# Patient Record
Sex: MALE | Race: BLACK OR AFRICAN AMERICAN | NOT HISPANIC OR LATINO | Employment: FULL TIME | URBAN - METROPOLITAN AREA
[De-identification: names, ages, dates, MRNs, and addresses within clinical notes are randomized per-mention and may not be internally consistent; named-entity substitution may affect disease eponyms.]

---

## 2019-06-25 PROBLEM — J34.2 DEVIATED NASAL SEPTUM: Status: ACTIVE | Noted: 2019-06-25

## 2019-06-25 PROBLEM — J32.2 CHRONIC ETHMOIDAL SINUSITIS: Status: ACTIVE | Noted: 2019-06-25

## 2019-06-25 PROBLEM — J34.3 NASAL TURBINATE HYPERTROPHY: Status: ACTIVE | Noted: 2019-06-25

## 2019-06-25 PROBLEM — J32.0 CHRONIC MAXILLARY SINUSITIS: Status: ACTIVE | Noted: 2019-06-25

## 2019-06-25 PROBLEM — J34.89 NASAL OBSTRUCTION: Status: ACTIVE | Noted: 2019-06-25

## 2019-06-25 PROBLEM — J32.3 CHRONIC SPHENOIDAL SINUSITIS: Status: ACTIVE | Noted: 2019-06-25

## 2019-06-25 PROBLEM — J32.1 CHRONIC FRONTAL SINUSITIS: Status: ACTIVE | Noted: 2019-06-25

## 2019-08-02 PROBLEM — J34.89 CONCHA BULLOSA: Status: ACTIVE | Noted: 2019-08-02

## 2024-06-03 ENCOUNTER — TELEPHONE (OUTPATIENT)
Dept: UROLOGY | Facility: CLINIC | Age: 59
End: 2024-06-03
Payer: COMMERCIAL

## 2024-06-03 NOTE — TELEPHONE ENCOUNTER
.Outgoing call placed to patient, patient verified name and date of birth, patient notified of below, he verbalized understanding and appt scheduled, no further assistance needed.      ----- Message from Jus Abbott MD sent at 5/29/2024  5:22 PM CDT -----  Mr Alexis is a long time patient of mine from my previous office.  Mr Alexis missed his appointment today on 5-29-24.  Please call Mr Alexis to see if he would like to reschedule his appointment with me.

## 2024-06-12 ENCOUNTER — LAB VISIT (OUTPATIENT)
Dept: LAB | Facility: HOSPITAL | Age: 59
End: 2024-06-12
Attending: UROLOGY
Payer: COMMERCIAL

## 2024-06-12 ENCOUNTER — OFFICE VISIT (OUTPATIENT)
Dept: UROLOGY | Facility: CLINIC | Age: 59
End: 2024-06-12
Payer: COMMERCIAL

## 2024-06-12 VITALS
WEIGHT: 181.69 LBS | DIASTOLIC BLOOD PRESSURE: 76 MMHG | SYSTOLIC BLOOD PRESSURE: 119 MMHG | HEART RATE: 61 BPM | HEIGHT: 70 IN | BODY MASS INDEX: 26.01 KG/M2 | RESPIRATION RATE: 14 BRPM

## 2024-06-12 DIAGNOSIS — N40.1 BPH WITH OBSTRUCTION/LOWER URINARY TRACT SYMPTOMS: ICD-10-CM

## 2024-06-12 DIAGNOSIS — R97.20 ELEVATED PSA: Primary | ICD-10-CM

## 2024-06-12 DIAGNOSIS — N13.8 BPH WITH OBSTRUCTION/LOWER URINARY TRACT SYMPTOMS: ICD-10-CM

## 2024-06-12 DIAGNOSIS — R97.20 ELEVATED PSA: ICD-10-CM

## 2024-06-12 LAB
BILIRUB UR QL STRIP: NEGATIVE
GLUCOSE UR QL STRIP: NEGATIVE
KETONES UR QL STRIP: NEGATIVE
LEUKOCYTE ESTERASE UR QL STRIP: NEGATIVE
PH, POC UA: 5.5
POC BLOOD, URINE: NEGATIVE
POC NITRATES, URINE: NEGATIVE
PROT UR QL STRIP: POSITIVE
SP GR UR STRIP: 1.03 (ref 1–1.03)
UROBILINOGEN UR STRIP-ACNC: 0.2 (ref 0.3–2.2)

## 2024-06-12 PROCEDURE — 82565 ASSAY OF CREATININE: CPT | Performed by: UROLOGY

## 2024-06-12 PROCEDURE — 3074F SYST BP LT 130 MM HG: CPT | Mod: CPTII,S$GLB,, | Performed by: UROLOGY

## 2024-06-12 PROCEDURE — 1159F MED LIST DOCD IN RCRD: CPT | Mod: CPTII,S$GLB,, | Performed by: UROLOGY

## 2024-06-12 PROCEDURE — 3078F DIAST BP <80 MM HG: CPT | Mod: CPTII,S$GLB,, | Performed by: UROLOGY

## 2024-06-12 PROCEDURE — 81003 URINALYSIS AUTO W/O SCOPE: CPT | Mod: QW,S$GLB,, | Performed by: UROLOGY

## 2024-06-12 PROCEDURE — 99214 OFFICE O/P EST MOD 30 MIN: CPT | Mod: S$GLB,,, | Performed by: UROLOGY

## 2024-06-12 PROCEDURE — 99999 PR PBB SHADOW E&M-EST. PATIENT-LVL III: CPT | Mod: PBBFAC,,, | Performed by: UROLOGY

## 2024-06-12 PROCEDURE — 3008F BODY MASS INDEX DOCD: CPT | Mod: CPTII,S$GLB,, | Performed by: UROLOGY

## 2024-06-12 PROCEDURE — 84153 ASSAY OF PSA TOTAL: CPT | Performed by: UROLOGY

## 2024-06-12 PROCEDURE — 36415 COLL VENOUS BLD VENIPUNCTURE: CPT | Performed by: UROLOGY

## 2024-06-12 NOTE — PROGRESS NOTES
"Subjective:       Patient ID: Jeffrey Alexis is a 58 y.o. male.    Chief Complaint: Annual Exam      History of Present Illness:     Mr Alexis has an elevated PSA.  His most recent PSA was 5.54 on 1-18-24.  He had a prostate MRI about a year ago did not show any evidence of prostate cancer.  Denies ever having a prostate biopsy.  Denies a family history of prostate cancer.  Urinary flow varies.  Occasional feelings of incomplete bladder emptying.  Nocturia X 1.  He is not bothered by his urinary symptoms.  No ED.         History reviewed. No pertinent past medical history.  No family history on file.  Social History     Socioeconomic History    Marital status: Single   Tobacco Use    Smoking status: Never    Smokeless tobacco: Never   Substance and Sexual Activity    Alcohol use: Never     Outpatient Encounter Medications as of 6/12/2024   Medication Sig Dispense Refill    budesonide (PULMICORT) 0.5 mg/2 mL nebulizer solution Add to sinus irrigations twice daily (Patient not taking: Reported on 6/12/2024) 120 mL 11    budesonide (PULMICORT) 0.5 mg/2 mL nebulizer solution Add one ampule to sinus rinses twice daily (Patient not taking: Reported on 6/12/2024) 120 mL 11    sod chlor-bicarb-squeez bottle (NEILMED SINUS RINSE COMPLETE) pkdv by sinus irrigation route. (Patient not taking: Reported on 6/12/2024)       No facility-administered encounter medications on file as of 6/12/2024.        Review of Systems   Constitutional:  Negative for chills and fever.   Respiratory:  Negative for shortness of breath.    Cardiovascular:  Negative for chest pain.   Gastrointestinal:  Negative for nausea and vomiting.   Genitourinary:  Negative for hematuria.   Musculoskeletal:  Negative for back pain.   Skin:  Negative for rash.   Neurological:  Negative for dizziness.   Psychiatric/Behavioral:  Negative for agitation.        Objective:     /76 (BP Location: Left arm, Patient Position: Sitting)   Pulse 61   Resp 14   Ht 5' 10" " (1.778 m)   Wt 82.4 kg (181 lb 10.5 oz)   BMI 26.07 kg/m²     Physical Exam  Constitutional:       Appearance: Normal appearance.   Pulmonary:      Effort: Pulmonary effort is normal.   Abdominal:      Palpations: Abdomen is soft.   Genitourinary:     Comments: Prostate exam deferred  Neurological:      Mental Status: He is alert and oriented to person, place, and time.   Psychiatric:         Mood and Affect: Mood normal.         Office Visit on 06/12/2024   Component Date Value Ref Range Status    POC Blood, Urine 06/12/2024 Negative (A)  Negative Final    POC Bilirubin, Urine 06/12/2024 Negative  Negative Final    POC Urobilinogen, Urine 06/12/2024 0.2 (A)  0.3 - 2.2 Final    POC Ketones, Urine 06/12/2024 Negative  Negative Final    POC Protein, Urine 06/12/2024 Positive (A)  Negative Final    POC Nitrates, Urine 06/12/2024 Negative  Negative Final    POC Glucose, Urine 06/12/2024 Negative  Negative Final    pH, UA 06/12/2024 5.5   Final    POC Specific Gravity, Urine 06/12/2024 1.030 (A)  1.003 - 1.029 Final    POC Leukocytes, Urine 06/12/2024 Negative  Negative Final        No results found for this or any previous visit (from the past 8760 hour(s)).     Assessment:       1. Elevated PSA    2. BPH with obstruction/lower urinary tract symptoms      Plan:     Orders Placed This Encounter    PSA, Total and Free    CREATININE, SERUM    POCT Urinalysis, Dipstick, Automated, W/O Scope          1-18-24  PSA 5.54    1-4-23  PSA 5.26    12-30-21  PSA 4.24    12-19-19  PSA 3.87    1-18-24  Cr 1.03    I reviewed the above lab results.         Assessment:  - Elevated PSA.  Prostate MRI about a year ago did not show any evidence of prostate cancer.  - BPH with mild non bothersome LUTS.    Plan:  - PSA and creatinine today.  If his PSA increases, then will consider a prostate MRI.  - RTC in 6 months.

## 2024-06-13 DIAGNOSIS — R97.20 ELEVATED PSA: Primary | ICD-10-CM

## 2024-06-13 LAB
CREAT SERPL-MCNC: 1.2 MG/DL (ref 0.5–1.4)
EST. GFR  (NO RACE VARIABLE): >60 ML/MIN/1.73 M^2
PROSTATE SPECIFIC ANTIGEN, TOTAL: 5.2 NG/ML (ref 0–4)
PSA FREE MFR SERPL: 18.27 %
PSA FREE SERPL-MCNC: 0.95 NG/ML (ref 0–1.5)

## 2024-06-14 ENCOUNTER — TELEPHONE (OUTPATIENT)
Dept: UROLOGY | Facility: CLINIC | Age: 59
End: 2024-06-14
Payer: COMMERCIAL

## 2024-06-14 NOTE — TELEPHONE ENCOUNTER
Called patient and after verifying name and date of birth rescheduled patient's appointment to October at [facility] and with a lab appointment the week before. Patient voiced understanding.    Milagro Arzaet LPN    ----- Message from Jus Abbott MD sent at 6/13/2024  5:06 PM CDT -----  I called and spoke with Mr Alexis just now and I let him know that I reviewed his PSA result and that his PSA is elevated at 5.2 but that it has mildly decreased from his previous PSA of 5.54 in January 2024.  I discussed options with him and the mutual decision was made to recheck his PSA in 4 months.  Please call the patient to reschedule his appointment to see me in 4 months.  Please schedule him a lab visit to recheck his PSA a few days prior to his 4 month appointment.  Also, I still have not received his [facility] records so please make sure that we get these records scanned into his chart.

## 2024-10-16 ENCOUNTER — LAB VISIT (OUTPATIENT)
Dept: LAB | Facility: HOSPITAL | Age: 59
End: 2024-10-16
Attending: UROLOGY
Payer: COMMERCIAL

## 2024-10-16 DIAGNOSIS — R97.20 ELEVATED PSA: ICD-10-CM

## 2024-10-16 PROCEDURE — 84153 ASSAY OF PSA TOTAL: CPT | Performed by: UROLOGY

## 2024-10-16 PROCEDURE — 84154 ASSAY OF PSA FREE: CPT | Performed by: UROLOGY

## 2024-10-16 PROCEDURE — 36415 COLL VENOUS BLD VENIPUNCTURE: CPT | Performed by: UROLOGY

## 2024-10-17 LAB
PROSTATE SPECIFIC ANTIGEN, TOTAL: 4.4 NG/ML (ref 0–4)
PSA FREE MFR SERPL: 17.95 %
PSA FREE SERPL-MCNC: 0.79 NG/ML (ref 0–1.5)

## 2024-10-23 ENCOUNTER — OFFICE VISIT (OUTPATIENT)
Dept: UROLOGY | Facility: CLINIC | Age: 59
End: 2024-10-23
Payer: COMMERCIAL

## 2024-10-23 VITALS
DIASTOLIC BLOOD PRESSURE: 66 MMHG | SYSTOLIC BLOOD PRESSURE: 113 MMHG | HEART RATE: 73 BPM | WEIGHT: 181.69 LBS | BODY MASS INDEX: 26.07 KG/M2

## 2024-10-23 DIAGNOSIS — R97.20 ELEVATED PSA: Primary | ICD-10-CM

## 2024-10-23 DIAGNOSIS — N40.1 HYPERPLASIA OF PROSTATE WITH LOWER URINARY TRACT SYMPTOMS (LUTS): ICD-10-CM

## 2024-10-23 DIAGNOSIS — R35.1 NOCTURIA: ICD-10-CM

## 2024-10-23 PROCEDURE — 3008F BODY MASS INDEX DOCD: CPT | Mod: CPTII,S$GLB,, | Performed by: UROLOGY

## 2024-10-23 PROCEDURE — 3078F DIAST BP <80 MM HG: CPT | Mod: CPTII,S$GLB,, | Performed by: UROLOGY

## 2024-10-23 PROCEDURE — 1159F MED LIST DOCD IN RCRD: CPT | Mod: CPTII,S$GLB,, | Performed by: UROLOGY

## 2024-10-23 PROCEDURE — 3074F SYST BP LT 130 MM HG: CPT | Mod: CPTII,S$GLB,, | Performed by: UROLOGY

## 2024-10-23 PROCEDURE — 99999 PR PBB SHADOW E&M-EST. PATIENT-LVL III: CPT | Mod: PBBFAC,,, | Performed by: UROLOGY

## 2024-10-23 PROCEDURE — 99214 OFFICE O/P EST MOD 30 MIN: CPT | Mod: S$GLB,,, | Performed by: UROLOGY

## 2024-10-23 NOTE — PROGRESS NOTES
Subjective:       Patient ID: Jeffrey Alexis is a 59 y.o. male.    Chief Complaint: Follow-up      History of Present Illness:     Mr Alexis has an elevated PSA that has decreased with observation.  He underwent a prostate MRI about a year ago did not show any evidence of prostate cancer.  No family history of prostate cancer.  He has BPH with mild non bothersome LUTS.  He says if he holds his urine for too long he sometimes has difficulty emptying his bladder.  Nocturia X 1-2.    Follow-up  Pertinent negatives include no chest pain, chills, fever, nausea, rash or vomiting.        No past medical history on file.  No family history on file.  Social History     Socioeconomic History    Marital status: Single   Tobacco Use    Smoking status: Never    Smokeless tobacco: Never   Substance and Sexual Activity    Alcohol use: Never     Outpatient Encounter Medications as of 10/23/2024   Medication Sig Dispense Refill    budesonide (PULMICORT) 0.5 mg/2 mL nebulizer solution Add to sinus irrigations twice daily 120 mL 11    budesonide (PULMICORT) 0.5 mg/2 mL nebulizer solution Add one ampule to sinus rinses twice daily 120 mL 11    sod chlor-bicarb-squeez bottle (NEILMED SINUS RINSE COMPLETE) pkdv by sinus irrigation route.       No facility-administered encounter medications on file as of 10/23/2024.        Review of Systems   Constitutional:  Negative for chills and fever.   Respiratory:  Negative for shortness of breath.    Cardiovascular:  Negative for chest pain.   Gastrointestinal:  Negative for nausea and vomiting.   Genitourinary:  Negative for dysuria and hematuria.   Musculoskeletal:  Negative for back pain.   Skin:  Negative for rash.   Neurological:  Negative for dizziness.   Psychiatric/Behavioral:  Negative for agitation.        Objective:     /66   Pulse 73   Wt 82.4 kg (181 lb 10.5 oz)   BMI 26.07 kg/m²     Physical Exam  Constitutional:       Appearance: Normal appearance.   Pulmonary:      Effort:  Pulmonary effort is normal.   Abdominal:      Palpations: Abdomen is soft.   Neurological:      Mental Status: He is alert and oriented to person, place, and time.   Psychiatric:         Mood and Affect: Mood normal.         No visits with results within 1 Day(s) from this visit.   Latest known visit with results is:   Lab Visit on 10/16/2024   Component Date Value Ref Range Status    PSA Total 10/16/2024 4.4 (H)  0.00 - 4.00 ng/mL Final    Comment: The testing method is a chemiluminescent microparticle immunoassay   manufactured by [employer] and performed on the [employer]k   or   [employer] system. Values obtained with different assay manufacturers   for   methods may be different and cannot be used interchangeably.  PSA Expected levels:  Hormonal Therapy: <0.05 ng/ml  Prostatectomy: <0.01 ng/ml  Radiation Therapy: <1.00 ng/ml      PSA, Free 10/16/2024 0.79  0.00 - 1.50 ng/mL Final    Comment: The testing method is a chemiluminescent microparticle immunoassay   manufactured by [employer] and performed on the DecisionDesk   or   [employer] system. Values obtained with different assay manufacturers   for   methods may be different and cannot be used interchangeably.      PSA, Free % 10/16/2024 17.95  Not established % Final        No results found for this or any previous visit (from the past 8760 hours).     Assessment:       1. Elevated PSA    2. Nocturia    3. Hyperplasia of prostate with lower urinary tract symptoms (LUTS)      Plan:     Orders Placed This Encounter    PSA, Total and Free        10-16-24  PSA 4.4.  Free % PSA 17.95.    6-12-24  PSA 5.2.  Free % PSA 18.27.     1-18-24  PSA 5.54     1-4-23  PSA 5.26     12-30-21  PSA 4.24     12-19-19  PSA 3.87    6-12-24  Cr 1.2.  GFR >60.     1-18-24  Cr 1.03     I reviewed the above lab results.            Assessment:  - Elevated PSA that has decreased with observation.  Prostate MRI about a year ago did not show any evidence of prostate  cancer.  - BPH with mild non bothersome LUTS.     Plan:  - The mutual decision was made to continue with observation of his elevated PSA with a repeat PSA in 6 months.  - I discussed dietary modifications with him today and I recommended he drink mostly water during the day.  - I recommended he limit his fluid intake at night to small amounts of water and to void just prior to bedtime.   - PSA in 6 months a few days prior to a 6 month office visit.

## 2025-04-16 ENCOUNTER — LAB VISIT (OUTPATIENT)
Dept: LAB | Facility: HOSPITAL | Age: 60
End: 2025-04-16
Attending: UROLOGY
Payer: COMMERCIAL

## 2025-04-16 DIAGNOSIS — R97.20 ELEVATED PSA: ICD-10-CM

## 2025-04-16 LAB
PSA FREE MFR SERPL: 14.78 %
PSA FREE SERPL-MCNC: 0.94 NG/ML
PSA SERPL-MCNC: 6.36 NG/ML

## 2025-04-16 PROCEDURE — 84154 ASSAY OF PSA FREE: CPT

## 2025-04-16 PROCEDURE — 36415 COLL VENOUS BLD VENIPUNCTURE: CPT

## 2025-04-24 ENCOUNTER — TELEPHONE (OUTPATIENT)
Dept: UROLOGY | Facility: CLINIC | Age: 60
End: 2025-04-24
Payer: COMMERCIAL

## 2025-04-24 ENCOUNTER — RESULTS FOLLOW-UP (OUTPATIENT)
Dept: UROLOGY | Facility: CLINIC | Age: 60
End: 2025-04-24

## 2025-04-24 NOTE — TELEPHONE ENCOUNTER
4/24/2025 0852 Spoke with the patient in regards to his elevated PSA level of 6.36. I confirmed his appt for 4/30/2025 at 1600. He states he rescheduled last week because he was out of town. He will be here for his upcoming appt. Pt verbalized understanding and had no further questions.   ARMENKarl Hendrickson LPN    ----- Message from Jus Abbott MD sent at 4/24/2025  7:38 AM CDT -----  Please call Mr Alexis and let him know that I reviewed his PSA and his PSA is elevated at 6.36 and it has increased from his previous PSA of 4.4 in October 2024.  He was scheduled to see me yesterday   but it looks like he rescheduled to see me next week.  Make sure he knows the details of his upcoming appointment with me.  Ask him if he has any questions.  ----- Message -----  From: Lab, Background User  Sent: 4/16/2025   3:41 PM CDT  To: Jus Abbott MD

## 2025-04-30 ENCOUNTER — OFFICE VISIT (OUTPATIENT)
Dept: UROLOGY | Facility: CLINIC | Age: 60
End: 2025-04-30
Payer: COMMERCIAL

## 2025-04-30 VITALS
BODY MASS INDEX: 26.01 KG/M2 | HEIGHT: 70 IN | SYSTOLIC BLOOD PRESSURE: 125 MMHG | WEIGHT: 181.69 LBS | HEART RATE: 72 BPM | DIASTOLIC BLOOD PRESSURE: 70 MMHG

## 2025-04-30 DIAGNOSIS — N40.0 BPH WITHOUT URINARY OBSTRUCTION: ICD-10-CM

## 2025-04-30 DIAGNOSIS — R97.20 ELEVATED PSA: Primary | ICD-10-CM

## 2025-04-30 PROCEDURE — 99214 OFFICE O/P EST MOD 30 MIN: CPT | Mod: S$GLB,,, | Performed by: UROLOGY

## 2025-04-30 PROCEDURE — 1159F MED LIST DOCD IN RCRD: CPT | Mod: CPTII,S$GLB,, | Performed by: UROLOGY

## 2025-04-30 PROCEDURE — 3078F DIAST BP <80 MM HG: CPT | Mod: CPTII,S$GLB,, | Performed by: UROLOGY

## 2025-04-30 PROCEDURE — 3074F SYST BP LT 130 MM HG: CPT | Mod: CPTII,S$GLB,, | Performed by: UROLOGY

## 2025-04-30 PROCEDURE — 3008F BODY MASS INDEX DOCD: CPT | Mod: CPTII,S$GLB,, | Performed by: UROLOGY

## 2025-04-30 PROCEDURE — 99999 PR PBB SHADOW E&M-EST. PATIENT-LVL III: CPT | Mod: PBBFAC,,, | Performed by: UROLOGY

## 2025-04-30 NOTE — PROGRESS NOTES
"Subjective:       Patient ID: Jeffrey Alexis is a 59 y.o. male.    Chief Complaint: Other Misc (6 mo f/u)      History of Present Illness:     Mr Alexis has an elevated PSA that has increased with observation.  His PSA increased from 4.4 on 10-16-24 to 6.36 on 4-16-25.  He has not had a prostate biopsy.  He underwent a prostate MRI on 11-22-22 that showed no suspicious prostate lesions identified by MRI.  BPH.  PIRADS 2.  Prostate size is 55.9 cc.  There is no lymphadenopathy. HE has BPH without significant LUTS.  Good urinary flow.  Nocturia X 1 on average.         No past medical history on file.  No family history on file.  Social History[1]  Encounter Medications[2]     Review of Systems   Constitutional:  Negative for chills and fever.   Respiratory:  Negative for shortness of breath.    Cardiovascular:  Negative for chest pain.   Gastrointestinal:  Negative for nausea and vomiting.   Genitourinary:  Negative for difficulty urinating, dysuria and hematuria.   Musculoskeletal:  Negative for back pain.   Skin:  Negative for rash.   Neurological:  Negative for dizziness.   Psychiatric/Behavioral:  Negative for agitation.        Objective:     /70 (BP Location: Right arm, Patient Position: Sitting)   Pulse 72   Ht 5' 10" (1.778 m)   Wt 82.4 kg (181 lb 10.5 oz)   BMI 26.07 kg/m²     Physical Exam  Constitutional:       Appearance: Normal appearance.   Pulmonary:      Effort: Pulmonary effort is normal.   Abdominal:      Palpations: Abdomen is soft.   Genitourinary:     Comments: Prostate exam deferred  Neurological:      Mental Status: He is alert and oriented to person, place, and time.   Psychiatric:         Mood and Affect: Mood normal.         No visits with results within 1 Day(s) from this visit.   Latest known visit with results is:   Lab Visit on 04/16/2025   Component Date Value Ref Range Status    Prostate Specific Antigen 04/16/2025 6.36 (H)  <=4.00 ng/mL Final    PSA Expected levels:  Hormonal " therapy: < 0.05 ng/mL   Prostatectomy: < 0.01 ng/mL   Radiation therapy: < 1.00 ng/mL      Prostate Specific Antigen Free 04/16/2025 0.94  <=1.50 ng/mL Final    PSA % Free 04/16/2025 14.78  Not established % Final        No results found for this or any previous visit (from the past 8760 hours).     Assessment:       1. Elevated PSA    2. BPH without urinary obstruction        Plan:     Orders Placed This Encounter    PSA, Total and Free    Creatinine, Serum          11-22-22  Prostate MRI.  BRG.  See report.  No suspicious prostate lesions identified by MRI.  BPH.  PIRADS 2.  Prostate size is 55.9 cc.  There is no lymphadenopathy.    I reviewed the above imaging result.         4-16-25  PSA 6.36.  Free % PSA 14.78.    10-16-24  PSA 4.4.  Free % PSA 17.95.     6-12-24  PSA 5.2.  Free % PSA 18.27.     1-18-24  PSA 5.54    7-25-23  PSA 4.64.  Free % PSA 15.9.     1-4-23  PSA 5.26     12-30-21  PSA 4.24     12-19-19  PSA 3.87     6-12-24  Cr 1.2.  GFR >60.     1-18-24  Cr 1.03     I reviewed the above lab results.            Assessment:  - Elevated PSA that has increased with observation.    - Prostate MRI on 11-22-22 showed no suspicious prostate lesions identified by MRI.  BPH.  PIRADS 2.  Prostate size is 55.9 cc.  There is no lymphadenopathy.  - BPH without significant LUTS.     Plan:  - I discussed his elevated and rising PSA with the patient today and I discussed doing a prostate MRI.  The patient says he would like to hold off on the prostate MRI at this time and recheck his PSA in 1 month.  - I discussed dietary modifications with him today and I recommended he drink mostly water during the day.  - PSA and creatinine both 1 month.  - RTC in 6 months or sooner depending on hs PSA result.                       [1]   Social History  Socioeconomic History    Marital status: Single   Tobacco Use    Smoking status: Never    Smokeless tobacco: Never   Substance and Sexual Activity    Alcohol use: Never   [2]    Outpatient Encounter Medications as of 4/30/2025   Medication Sig Dispense Refill    budesonide (PULMICORT) 0.5 mg/2 mL nebulizer solution Add to sinus irrigations twice daily 120 mL 11    budesonide (PULMICORT) 0.5 mg/2 mL nebulizer solution Add one ampule to sinus rinses twice daily 120 mL 11    sod chlor-bicarb-squeez bottle (NEILMED SINUS RINSE COMPLETE) pkdv by sinus irrigation route.       No facility-administered encounter medications on file as of 4/30/2025.

## 2025-05-30 ENCOUNTER — LAB VISIT (OUTPATIENT)
Dept: LAB | Facility: HOSPITAL | Age: 60
End: 2025-05-30
Attending: UROLOGY
Payer: COMMERCIAL

## 2025-05-30 DIAGNOSIS — R97.20 ELEVATED PSA: ICD-10-CM

## 2025-05-30 LAB
CREAT SERPL-MCNC: 0.9 MG/DL (ref 0.5–1.4)
GFR SERPLBLD CREATININE-BSD FMLA CKD-EPI: >60 ML/MIN/1.73/M2
PSA FREE MFR SERPL: 16.6 %
PSA FREE SERPL-MCNC: 1.24 NG/ML
PSA SERPL-MCNC: 7.47 NG/ML

## 2025-05-30 PROCEDURE — 36415 COLL VENOUS BLD VENIPUNCTURE: CPT

## 2025-05-30 PROCEDURE — 84154 ASSAY OF PSA FREE: CPT

## 2025-05-30 PROCEDURE — 82565 ASSAY OF CREATININE: CPT

## 2025-06-03 ENCOUNTER — RESULTS FOLLOW-UP (OUTPATIENT)
Dept: UROLOGY | Facility: HOSPITAL | Age: 60
End: 2025-06-03

## 2025-06-03 ENCOUNTER — TELEPHONE (OUTPATIENT)
Facility: CLINIC | Age: 60
End: 2025-06-03
Payer: COMMERCIAL

## 2025-06-03 DIAGNOSIS — R97.20 ELEVATED PSA: Primary | ICD-10-CM

## 2025-06-20 ENCOUNTER — TELEPHONE (OUTPATIENT)
Dept: UROLOGY | Facility: CLINIC | Age: 60
End: 2025-06-20
Payer: COMMERCIAL

## 2025-06-20 NOTE — TELEPHONE ENCOUNTER
Outgoing call placed to patient in regards to rescheduling 07/02/25 appointment due to provider being out of the office. No answer, left vm instructing patient to return call for assistance on rescheduling.

## 2025-06-25 ENCOUNTER — HOSPITAL ENCOUNTER (OUTPATIENT)
Dept: RADIOLOGY | Facility: HOSPITAL | Age: 60
Discharge: HOME | End: 2025-06-25
Attending: UROLOGY
Payer: COMMERCIAL

## 2025-06-25 DIAGNOSIS — R97.20 ELEVATED PSA: ICD-10-CM

## 2025-06-25 PROCEDURE — 25500020 PHARM REV CODE 255: Performed by: UROLOGY

## 2025-06-25 PROCEDURE — A9585 GADOBUTROL INJECTION: HCPCS | Performed by: UROLOGY

## 2025-06-25 PROCEDURE — 72197 MRI PELVIS W/O & W/DYE: CPT | Mod: 26,,, | Performed by: RADIOLOGY

## 2025-06-25 PROCEDURE — 72197 MRI PELVIS W/O & W/DYE: CPT | Mod: TC

## 2025-06-25 RX ORDER — GADOBUTROL 604.72 MG/ML
8.5 INJECTION INTRAVENOUS
Status: COMPLETED | OUTPATIENT
Start: 2025-06-25 | End: 2025-06-25

## 2025-06-25 RX ADMIN — GADOBUTROL 8.5 ML: 604.72 INJECTION INTRAVENOUS at 03:06

## 2025-06-29 ENCOUNTER — RESULTS FOLLOW-UP (OUTPATIENT)
Facility: CLINIC | Age: 60
End: 2025-06-29

## 2025-06-30 ENCOUNTER — TELEPHONE (OUTPATIENT)
Dept: UROLOGY | Facility: CLINIC | Age: 60
End: 2025-06-30
Payer: COMMERCIAL

## 2025-06-30 NOTE — TELEPHONE ENCOUNTER
Called and spoke to pt; informed him of his test result; pt verbalized understanding.  Aixa Douglas LPN  ----- Message from Jus Abbott MD sent at 6/29/2025 12:26 PM CDT -----  Please call Mr Alexis and let him know that I reviewed his prostate MRI result and that it did not show a concerning prostate lesion.  I does show that he has an enlarged prostate gland.  Explain that   a prostate MRI can potentially miss prostate cancer.  Make sure he knows the details of his follow up appointment with me in July 2025 for further discussion.  Ask him if he has any questions.  ----- Message -----  From: Interface, Rad Results In  Sent: 6/25/2025   4:32 PM CDT  To: Jus Abbott MD

## 2025-07-23 ENCOUNTER — OFFICE VISIT (OUTPATIENT)
Dept: UROLOGY | Facility: CLINIC | Age: 60
End: 2025-07-23
Payer: COMMERCIAL

## 2025-07-23 VITALS
SYSTOLIC BLOOD PRESSURE: 125 MMHG | HEART RATE: 80 BPM | BODY MASS INDEX: 26.01 KG/M2 | HEIGHT: 70 IN | DIASTOLIC BLOOD PRESSURE: 75 MMHG | WEIGHT: 181.69 LBS

## 2025-07-23 DIAGNOSIS — N40.0 BPH WITHOUT URINARY OBSTRUCTION: ICD-10-CM

## 2025-07-23 DIAGNOSIS — R97.20 ELEVATED PSA: Primary | ICD-10-CM

## 2025-07-23 PROCEDURE — 3074F SYST BP LT 130 MM HG: CPT | Mod: CPTII,S$GLB,, | Performed by: UROLOGY

## 2025-07-23 PROCEDURE — 1159F MED LIST DOCD IN RCRD: CPT | Mod: CPTII,S$GLB,, | Performed by: UROLOGY

## 2025-07-23 PROCEDURE — 99999 PR PBB SHADOW E&M-EST. PATIENT-LVL III: CPT | Mod: PBBFAC,,, | Performed by: UROLOGY

## 2025-07-23 PROCEDURE — 3008F BODY MASS INDEX DOCD: CPT | Mod: CPTII,S$GLB,, | Performed by: UROLOGY

## 2025-07-23 PROCEDURE — 99214 OFFICE O/P EST MOD 30 MIN: CPT | Mod: S$GLB,,, | Performed by: UROLOGY

## 2025-07-23 PROCEDURE — 3078F DIAST BP <80 MM HG: CPT | Mod: CPTII,S$GLB,, | Performed by: UROLOGY

## 2025-07-23 NOTE — PROGRESS NOTES
"Subjective:       Patient ID: Jeffrey Alexis is a 59 y.o. male.    Chief Complaint: Other Misc (MRI F/U )      History of Present Illness:     Mr Alexis has an elevated PSA that has increased with observation.  His PSA has increased from 4.4 on 10-16-24 to 6.36 on 4-16-25 to 7.47 on 5-30-25.  He has never had a prostate biopsy.  Denies LUTS.  Normal urinary flow.  Nocturia X 0-1.  Denies a family history of prostate cancer.    He underwent a prostate MRI on 6-25-25 that showed no focal suspicious lesion in the prostate gland to localize a clinically significant prostate cancer.  PI-RADS 2.  Total prostate volume is 52.56 cc.  No pelvic lymphadenopathy.         No past medical history on file.  No family history on file.  Social History[1]  Encounter Medications[2]     Review of Systems   Constitutional:  Negative for chills and fever.   Respiratory:  Negative for shortness of breath.    Cardiovascular:  Negative for chest pain.   Gastrointestinal:  Negative for nausea and vomiting.   Genitourinary:  Negative for difficulty urinating, dysuria and hematuria.   Musculoskeletal:  Negative for back pain.   Skin:  Negative for rash.   Neurological:  Negative for dizziness.   Psychiatric/Behavioral:  Negative for agitation.        Objective:     /75 (BP Location: Right arm, Patient Position: Sitting)   Pulse 80   Ht 5' 10" (1.778 m)   Wt 82.4 kg (181 lb 10.5 oz)   BMI 26.07 kg/m²     Physical Exam  Constitutional:       Appearance: Normal appearance.   Pulmonary:      Effort: Pulmonary effort is normal.   Abdominal:      Palpations: Abdomen is soft.   Neurological:      Mental Status: He is alert and oriented to person, place, and time.   Psychiatric:         Mood and Affect: Mood normal.         No visits with results within 1 Day(s) from this visit.   Latest known visit with results is:   Lab Visit on 05/30/2025   Component Date Value Ref Range Status    Prostate Specific Antigen 05/30/2025 7.47 (H)  <=4.00 ng/mL " Final    PSA Expected levels:  Hormonal therapy: < 0.05 ng/mL   Prostatectomy: < 0.01 ng/mL   Radiation therapy: < 1.00 ng/mL      Prostate Specific Antigen Free 05/30/2025 1.24  <=1.50 ng/mL Final    PSA % Free 05/30/2025 16.60  Not established % Final    Creatinine 05/30/2025 0.9  0.5 - 1.4 mg/dL Final    eGFR 05/30/2025 >60  >60 mL/min/1.73/m2 Final    Estimated GFR calculated using the CKD-EPI creatinine (2021) equation.        No results found for this or any previous visit (from the past 8760 hours).     Assessment:       1. Elevated PSA    2. BPH without urinary obstruction        Plan:     Orders Placed This Encounter    PSA, Total and Free        6-25-25  Prostate MRI.  See report.  No focal suspicious lesion in the prostate gland to localize a clinically significant prostate cancer.  PI-RADS 2.  Total prostate volume is 52.56 cc.  No pelvic lymphadenopathy.    11-22-22  Prostate MRI.  BRG.  See report.  No suspicious prostate lesions identified by MRI.  BPH.  PIRADS 2.  Prostate size is 55.9 cc.  There is no lymphadenopathy.     I reviewed the above imaging result.           5-30-25  PSA 7.47.  Free % PSA 16.60.     4-16-25  PSA 6.36.  Free % PSA 14.78.     10-16-24  PSA 4.4.  Free % PSA 17.95.     6-12-24  PSA 5.2.  Free % PSA 18.27.     1-18-24  PSA 5.54     7-25-23  PSA 4.64.  Free % PSA 15.9.     1-4-23  PSA 5.26     12-30-21  PSA 4.24     12-19-19  PSA 3.87    5-30-25  Cr 0.9.  GFR >60.     I reviewed the above lab results.            Assessment:  - Elevated PSA that has increased with observation.  He has never had a prostate biopsy.  - Prostate MRI on 6-25-25 showed no focal suspicious lesion in the prostate gland to localize a clinically significant prostate cancer.  PI-RADS 2.  Total prostate volume is 52.56 cc.  No pelvic lymphadenopathy.  - BPH without LUTS.  PVR today on 7-23-25 is 18 ml.    Plan:  - I discussed his prostate MRI result with the patient today and explained that his prostate MRI  did not show prostate cancer.  I explained that a prostate MRI can potentially miss prostate cancer.  I recommended that he consider undergoing a prostate biopsy due to his elevated and rising PSA.  I answered his questions to his apparent understanding and apparent satisfaction.  The patient says that he wants to hold off on a prostate biopsy at this time and that he wants to proceed with observation with a repeat PSA in 4 months.   - I discussed dietary modifications with him today and I recommended he drink mostly water during the day.  - PSA in 4 months a few days prior to a 4 month appointment.  - RTC in 4 months or sooner as needed.                   [1]   Social History  Socioeconomic History    Marital status: Single   Tobacco Use    Smoking status: Never    Smokeless tobacco: Never   Substance and Sexual Activity    Alcohol use: Never   [2]   Outpatient Encounter Medications as of 7/23/2025   Medication Sig Dispense Refill    budesonide (PULMICORT) 0.5 mg/2 mL nebulizer solution Add to sinus irrigations twice daily (Patient not taking: Reported on 7/23/2025) 120 mL 11    budesonide (PULMICORT) 0.5 mg/2 mL nebulizer solution Add one ampule to sinus rinses twice daily (Patient not taking: Reported on 7/23/2025) 120 mL 11    sod chlor-bicarb-squeez bottle (NEILMED SINUS RINSE COMPLETE) pkdv by sinus irrigation route. (Patient not taking: Reported on 7/23/2025)       No facility-administered encounter medications on file as of 7/23/2025.